# Patient Record
Sex: FEMALE | Race: WHITE | NOT HISPANIC OR LATINO
[De-identification: names, ages, dates, MRNs, and addresses within clinical notes are randomized per-mention and may not be internally consistent; named-entity substitution may affect disease eponyms.]

---

## 2024-02-08 ENCOUNTER — NON-APPOINTMENT (OUTPATIENT)
Age: 44
End: 2024-02-08

## 2024-03-12 ENCOUNTER — LABORATORY RESULT (OUTPATIENT)
Age: 44
End: 2024-03-12

## 2024-03-12 ENCOUNTER — TRANSCRIPTION ENCOUNTER (OUTPATIENT)
Age: 44
End: 2024-03-12

## 2024-03-12 ENCOUNTER — APPOINTMENT (OUTPATIENT)
Dept: NEUROLOGY | Facility: CLINIC | Age: 44
End: 2024-03-12
Payer: COMMERCIAL

## 2024-03-12 VITALS
SYSTOLIC BLOOD PRESSURE: 123 MMHG | BODY MASS INDEX: 23.7 KG/M2 | DIASTOLIC BLOOD PRESSURE: 77 MMHG | OXYGEN SATURATION: 98 % | WEIGHT: 160 LBS | HEART RATE: 68 BPM | HEIGHT: 69 IN

## 2024-03-12 DIAGNOSIS — Z82.0 FAMILY HISTORY OF EPILEPSY AND OTHER DISEASES OF THE NERVOUS SYSTEM: ICD-10-CM

## 2024-03-12 DIAGNOSIS — Z78.9 OTHER SPECIFIED HEALTH STATUS: ICD-10-CM

## 2024-03-12 PROCEDURE — 99215 OFFICE O/P EST HI 40 MIN: CPT

## 2024-03-12 NOTE — ASSESSMENT
[FreeTextEntry1] : Jessi Looney is a 43 year old female with no reported past medical history here for hospital follow-up.  Presenting to [facility] on 1/25/24 with intermittent paresthesia x 1 month to left arm and right leg and recent persistent head pressure/pain associated with neck discomfort/ tension. CT head with lucency in the left lenticulostriate nucleus, this could present a prominent perivascular space.  MRI brain confirmed incidental dilated perivascular space, no acute infarct.  MRI cervical spine with C5-6 disc herniation, no cord compression.  Started on prednisone pack for 1 week following viral infection post hospital which resolved her headaches.  Suspect possible occipital neuralgia.  With persistent paresthesia and family hx of autoimmune disorders including self, possible lupus diagnosis in the past (never followed up with rheumatology) will further investigate. On exam, + Phalens test on left arm otherwise no abnormal findings.  EMG/ NCV left arm and right leg will send labs to rule out contributing factors to paresthesia such as autoimmune panel, vitamin B12, B1, folate, ESR, CRP MRI Lumbar spine for intermittent right leg numbness to rule out structural abnormalities if head pain re-occurs can trial gabapentin or pregabalin in the future for management  Follow up in 2 months after testing complete

## 2024-03-12 NOTE — DATA REVIEWED
[de-identified] : 1/25/24- MRI Brain- FINDINGS: The ventricles, cisternal spaces, and cortical sulci are appropriate for the patient's stated age. There is no midline shift or extra-axial collection. No abnormal intracranial enhancement is present.   No abnormal signal is identified in the brain parenchyma. The small lucency within the inferior left putamen on earlier CT corresponds with a dilated perivascular space. The diffusion-weighted images demonstrate no evidence of recent ischemic injury. The susceptibility-weighted images demonstrate no parenchymal blood products. The large vascular flow-voids are preserved.   The paranasal sinuses and mastoid air cells are clear. The bone marrow signal is normal.  IMPRESSION:   Normal contrast-enhanced MRI of the brain. Incidental dilated perivascular space corresponds with the CT finding.  1/25/24- MRI Cervical Spine- FINDINGS:  Cervical vertebral alignment is preserved.   Facet joints appear aligned.   Cervical vertebral body heights are maintained. Cervical vertebral marrow signal intensity Is intact.  No pathologic vertebral enhancement is found.  No osseous expansion or epidural disease is found..  No destructive bone lesion is found.  Cervical intervertebral disc spaces demonstrate mild degenerative disc height loss at C5-C6. Cervical intervertebral disc signal intensity demonstrate mild variable signal intensity loss on the long TR images in the mid cervical spine.  No abnormal disc space enhancement is recognized. At C5-C6 shallow left central disc protrusion deforms the ventral thecal sac without direct compromise of the ventral cord surface. At the remaining cervical intervertebral disc levels no degenerative high-grade central canal compromise is recognized.  Neural foramina appear mildly narrowed by uncovertebral joint osteophytes in the lower cervical spine.   Cervical cord morphology Is preserved.  The cervical cord maintains intact signal intensity   No focal intrinsic cord lesion is identified.  No cord expansion or cord volume loss is recognized.  No abnormal enhancement occurs within the canal.  The visualized adjacent neck structures appear intact. The level 1 submental space structure 0.8 cm short axis is asymmetric to the contralateral left. It demonstrates no well-defined fatty hilum. Paraspinal soft tissues appear intact.  Visualized lymph nodes at the remaining lymph node stations appear to be within physiologic size limits.   IMPRESSION:     1. C5-C6 shallow left central disc protrusion  (disc herniation)  2. Right submandibular space 0.8 cm soft tissue nodule, likely borderline enlarged lymph node  [de-identified] : 1/25/24- CT Head- FINDINGS:   No acute intracranial hemorrhage. Lucency in the left lenticulostriate nucleus. No hydrocephalus. The extra-axial spaces and basal cisterns are within normal limits. No midline shift or mass effect present.  The cranial cervical junction is within normal limits. The sella is not expanded. No depressed calvarial fracture. The visualized paranasal sinuses are well aerated. The visualized mastoid air cells are well aerated. The visualized orbits are within normal limits.  IMPRESSION:   1.  No evidence of acute intracranial hemorrhage or midline shift. 2.  Lucency in the left lenticulostriate nucleus. This could represent a prominent perivascular space. Alternatively, a subacute to chronic infarct could present similarly. If there is continued concern for acute neurologic demise, recommend MRI of the brain for further evaluation.  Labs 1/25/24- TSH 1.91, Free T4 1.3

## 2024-03-12 NOTE — HISTORY OF PRESENT ILLNESS
[FreeTextEntry1] : Jessi Grey is a 42 yo female with no reported PMH here for hospital follow-up.  Presented to [facility] hospital on 1/25/24 with persistent head pressure associated with neck discomfort/ tension. Per patient, around 1 month prior started noticed back and neck pain making tasks such as getting out of bed more difficult.  Started taking OTC NSAIDS with moderate improvement.  Intermittent paresthesia with pins and needles to left arm from shoulder to fingertips.  She also experiences separately intermittent numbness to entire right leg triggered by driving.  Denies injury or lifting heavy objects besides her two children who weigh 30lbs.  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  CT head with lucency in the left lenticulostriate nucleus, this could present a prominent perivascular space.  MRI brain confirmed incidental dilated perivascular space, no acute infarct.  MRI cervical spine with C5-6 disc herniation, no cord compression.  Discharged home with persistent pain from neck/ back of head radiating to crown.  Developed viral infection following hospitalization in which she was put on prednisone which completed resolved her symptoms.  Since she has been feeling well.  Reports similar symptoms approximately 10 years ago in which she saw rheumatologist for possible diagnosis of lupus? never followed up after for symptoms resolved.   No hx of migraines.  She reports she has extensive family history of autoimmune disorders including an uncle that recently passed from Multiple Sclerosis. Denies vision changes, slurred speech, difficulty swallowing or chewing, muscle fatigue, gait instability, recent falls or injury.

## 2024-03-12 NOTE — PHYSICAL EXAM
[FreeTextEntry1] : General: No acute distress, Awake, Alert.    Mental status   Awake, alert, and oriented to person, time and place, Normal attention span and concentration, Recent and remote memory intact, Language intact, Fund of knowledge intact.     Cranial Nerves    II: VFF    III, IV, VI: PERRL, EOMI.   V: Facial sensation is normal B/L.    VII: Facial strength is normal B/L.    VIII: Gross hearing is intact.    IX, X: Palate is midline and elevates symmetrically.    XI: Trapezius normal strength.    XII: Tongue midline without atrophy or fasciculations.        Motor exam   Muscle tone - no evidence of rigidity or resistance in all 4 extremities.   No atrophy or fasciculations   Muscle Strength: arms and legs, proximal and distal flexors and extensors are normal  5/5 No UE drift.     + Phalens on left  Reflexes   All present, normal, and symmetrical 2+   Coordination   Finger to nose: Normal.   Heel to shin: Normal.       Sensory   Intact sensation to PP, vibration and cold.     Gait   Normal including heels, toes, and tandem gait.

## 2024-03-18 ENCOUNTER — NON-APPOINTMENT (OUTPATIENT)
Age: 44
End: 2024-03-18

## 2024-03-19 ENCOUNTER — NON-APPOINTMENT (OUTPATIENT)
Age: 44
End: 2024-03-19

## 2024-03-19 DIAGNOSIS — Z00.00 ENCOUNTER FOR GENERAL ADULT MEDICAL EXAMINATION W/OUT ABNORMAL FINDINGS: ICD-10-CM

## 2024-03-19 LAB
ACE BLD-CCNC: 48 U/L
ALBUPE: 17.6 %
ALPHA1UPE: 32 %
ALPHA2UPE: 21.2 %
ANA PAT FLD IF-IMP: ABNORMAL
ANACR T: ABNORMAL
BETAUPE: 16.6 %
CRP SERPL-MCNC: <3 MG/L
ENA SS-A AB SER IA-ACNC: <0.2 AL
ENA SS-B AB SER IA-ACNC: <0.2 AL
ERYTHROCYTE [SEDIMENTATION RATE] IN BLOOD BY WESTERGREN METHOD: 29 MM/HR
ESTIMATED AVERAGE GLUCOSE: 114 MG/DL
FOLATE SERPL-MCNC: >20 NG/ML
GAMMAUPE: 12.6 %
HBA1C MFR BLD HPLC: 5.6 %
IGA 24H UR QL IFE: NORMAL
KAPPA LC 24H UR QL: NORMAL
PROT PATTERN 24H UR ELPH-IMP: NORMAL
PROT UR-MCNC: 6 MG/DL
PROT UR-MCNC: 6 MG/DL
RHEUMATOID FACT SER QL: <10 IU/ML
TTG IGA SER IA-ACNC: 17.5 U/ML
TTG IGA SER-ACNC: POSITIVE
TTG IGG SER IA-ACNC: 25.4 U/ML
TTG IGG SER IA-ACNC: POSITIVE
VIT B1 SERPL-MCNC: 114.6 NMOL/L
VIT B12 SERPL-MCNC: 681 PG/ML
VIT B6 SERPL-MCNC: 32.8 UG/L

## 2024-03-20 LAB
ALBUMIN MFR SERPL ELPH: 60.6 %
ALBUMIN SERPL-MCNC: 4.7 G/DL
ALBUMIN/GLOB SERPL: 1.5 RATIO
ALPHA1 GLOB MFR SERPL ELPH: 3.6 %
ALPHA1 GLOB SERPL ELPH-MCNC: 0.3 G/DL
ALPHA2 GLOB MFR SERPL ELPH: 9.6 %
ALPHA2 GLOB SERPL ELPH-MCNC: 0.7 G/DL
B-GLOBULIN MFR SERPL ELPH: 10.8 %
B-GLOBULIN SERPL ELPH-MCNC: 0.8 G/DL
GAMMA GLOB FLD ELPH-MCNC: 1.2 G/DL
GAMMA GLOB MFR SERPL ELPH: 15.4 %
INTERPRETATION SERPL IEP-IMP: NORMAL
PROT SERPL-MCNC: 7.8 G/DL
PROT SERPL-MCNC: 7.8 G/DL

## 2024-04-01 ENCOUNTER — RESULT REVIEW (OUTPATIENT)
Age: 44
End: 2024-04-01

## 2024-04-16 ENCOUNTER — APPOINTMENT (OUTPATIENT)
Dept: NEUROLOGY | Facility: CLINIC | Age: 44
End: 2024-04-16
Payer: COMMERCIAL

## 2024-04-16 DIAGNOSIS — M54.12 RADICULOPATHY, CERVICAL REGION: ICD-10-CM

## 2024-04-16 DIAGNOSIS — R20.2 PARESTHESIA OF SKIN: ICD-10-CM

## 2024-04-16 PROCEDURE — 99204 OFFICE O/P NEW MOD 45 MIN: CPT

## 2024-04-16 PROCEDURE — 95912 NRV CNDJ TEST 11-12 STUDIES: CPT

## 2024-04-16 PROCEDURE — 95886 MUSC TEST DONE W/N TEST COMP: CPT

## 2024-04-16 NOTE — ASSESSMENT
[FreeTextEntry1] : EMG/NCS of the right leg and left arm showed mild right L3 or L4 radiculopathy and no electrical correlate for left arm symptoms.  EMG REPORT WILL BE UPLOADED SEPARATELY AS A PDF

## 2024-04-16 NOTE — DATA REVIEWED
[de-identified] : ESR 29 H, + transglutaminase IgA and IgG.  ELIZABETH 1:160 reflex neg SPEP, RF, CRP, B12, folate, lyme, ACE, b1, b6, ANCA, immunofixation MRI L spine with L3-L4 rightward disc encroaching neural foramina MRI C spine with C5-C6 shallow left central disc protrusion MRI brain prominent perivascular space

## 2024-04-16 NOTE — HISTORY OF PRESENT ILLNESS
[FreeTextEntry1] : LILIA LUCIO is a 43 year who is referred for clinical neurophysiological consultation and EMG/NCS for paresthesias  Symptoms most in right leg and left arm.  Left arm was months ago and right leg was 2 months ago. Worse when sitting and diving in car. Sometimes symptoms when in bed a night.  Not painful but there is numbness down the whole leg. Feels a need to shake the leg or the arm to relieve symptoms.  She had pain in the neck and back last about 3 months ago but it is not currently present.  Symptoms don't wake her from sleep but the left arm is often present in the morning.  Denies stomach issues.   Denies diplopia, blurred vision, dysphagia, dysarthria, aphasia, focal weakness or numbness, bowel or bladder dysfunction, imbalance, falls, headaches.

## 2024-04-16 NOTE — PHYSICAL EXAM
[FreeTextEntry1] : General: this is a pleasant patient in no acute distress  HEENT conjunctiva are normal, no tenderness in head  CV: normal pulses, regular rate and rhythm, no peripheral edema noted  Lungs: breathing is non-labored  abd: soft and non-distended  MSK: SLR: neg CURLY: neg range of motion: tinnels: neg spurling: Occipital nerve tenderness:  Mental status: Alert and oriented to person, place and time, normal speech and comprehension  Cranial Nerves: extra-occular movements in tact without nystagmus, normal saccades and smooth pursuit, Face symmetric and facial strength symmetric, facial sensation symmetric,   Motor: normal bulk and tone throughout. no abnormal movements.  Full 5/5 strength uppers and lower extremities proximally and distally  Sensory: in tact and symmetric to vibration, light tough, temperature  Cerebellar: normal finger-nose-finger bilaterally  Reflexes: 2+ in the upper and lower extremities and symmetric.  toes are bilaterally downgoing.  Gait: stable, able to tip toe heel and tandem  Stances: Romberg: normal Shapened romberg: normal

## 2024-04-22 ENCOUNTER — APPOINTMENT (OUTPATIENT)
Dept: NEUROLOGY | Facility: CLINIC | Age: 44
End: 2024-04-22
Payer: COMMERCIAL

## 2024-04-22 VITALS
SYSTOLIC BLOOD PRESSURE: 130 MMHG | OXYGEN SATURATION: 98 % | RESPIRATION RATE: 18 BRPM | HEART RATE: 72 BPM | DIASTOLIC BLOOD PRESSURE: 80 MMHG

## 2024-04-22 DIAGNOSIS — M54.16 RADICULOPATHY, LUMBAR REGION: ICD-10-CM

## 2024-04-22 PROCEDURE — 99214 OFFICE O/P EST MOD 30 MIN: CPT

## 2024-04-22 NOTE — HISTORY OF PRESENT ILLNESS
[FreeTextEntry1] : Jessi is here in follow-up. EMG/ NCV left arm and right leg slight abnormal, evidence of mild right L3 or L4 radiculopathy.  No electrical correlate for left arm symptoms.  No evidence of myopathy, plexopathy, entrapment neuropathy or polyneuropathy. MRI Lumbar spine- small disc protrusion at L3-L4 and L5-S1, essentially unremarkable.   Referral given for PT provided - did not start as of yet. Head pressure, neck discomfort/ tension have resolved since last visit. Anacr abnormal, ESR elevated - referred to rheumatology, working on getting appointment in the upcoming weeks. Overall symptoms in right leg continue, triggered by driving and sitting for extended period of time.  She does not feel it is progressively getting worse but has not improved since last visit.  Alleviating with movement and standing.  Most pronounced in anterior thigh and radiates down leg.  No pain or weakness.  3/12/24 Jessi Grey is a 42 yo female with no reported PMH here for hospital follow-up.  Presented to [facility] hospital on 1/25/24 with persistent head pressure associated with neck discomfort/ tension. Per patient, around 1 month prior started noticed back and neck pain making tasks such as getting out of bed more difficult.  Started taking OTC NSAIDS with moderate improvement.  Intermittent paresthesia with pins and needles to left arm from shoulder to fingertips.  She also experiences separately intermittent numbness to entire right leg triggered by driving.  Denies injury or lifting heavy objects besides her two children who weigh 30lbs.  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  CT head with lucency in the left lenticulostriate nucleus, this could present a prominent perivascular space.  MRI brain confirmed incidental dilated perivascular space, no acute infarct.  MRI cervical spine with C5-6 disc herniation, no cord compression.  Discharged home with persistent pain from neck/ back of head radiating to crown.  Developed viral infection following hospitalization in which she was put on prednisone which completed resolved her symptoms.  Since she has been feeling well.  Reports similar symptoms approximately 10 years ago in which she saw rheumatologist for possible diagnosis of lupus? never followed up after for symptoms resolved.   No hx of migraines.  She reports she has extensive family history of autoimmune disorders including an uncle that recently passed from Multiple Sclerosis. Denies vision changes, slurred speech, difficulty swallowing or chewing, muscle fatigue, gait instability, recent falls or injury.

## 2024-04-22 NOTE — ASSESSMENT
[FreeTextEntry1] : Jessi Looney is a 43 year old female with no reported past medical history here for hospital follow-up.  Presenting to [facility] on 1/25/24 with intermittent paresthesia x 1 month to left arm and right leg and recent persistent head pressure/pain associated with neck discomfort/ tension. CT head with lucency in the left lenticulostriate nucleus, this could present a prominent perivascular space.  MRI brain confirmed incidental dilated perivascular space, no acute infarct.  MRI cervical spine with C5-6 disc herniation, no cord compression.  Started on prednisone pack for 1 week following viral infection post hospital which resolved her headaches.  Suspect possible occipital neuralgia.  With persistent paresthesia and family hx of autoimmune disorders including self, possible lupus diagnosis in the past (never followed up with rheumatology) will further investigate. On exam, + Phalens test on left arm otherwise no abnormal findings. EMG/ NCV with evidence of lumbar radiculopathy.  Neck pain and head pressure have resolved at this time.    Physical therapy for lumbar radiculopathy. NSAIDs or acetaminophen as needed. Encouraged activity modifications, frequent stretching and ambulation.  Avoid prolong sitting if possible. In future if symptoms persist can consider muscle relaxants or possible NEVAEH.  Follow up in 3 months

## 2024-04-22 NOTE — DATA REVIEWED
[de-identified] : 1/25/24- MRI Brain- FINDINGS: The ventricles, cisternal spaces, and cortical sulci are appropriate for the patient's stated age. There is no midline shift or extra-axial collection. No abnormal intracranial enhancement is present.   No abnormal signal is identified in the brain parenchyma. The small lucency within the inferior left putamen on earlier CT corresponds with a dilated perivascular space. The diffusion-weighted images demonstrate no evidence of recent ischemic injury. The susceptibility-weighted images demonstrate no parenchymal blood products. The large vascular flow-voids are preserved.   The paranasal sinuses and mastoid air cells are clear. The bone marrow signal is normal.  IMPRESSION:   Normal contrast-enhanced MRI of the brain. Incidental dilated perivascular space corresponds with the CT finding.  1/25/24- MRI Cervical Spine- FINDINGS:  Cervical vertebral alignment is preserved.   Facet joints appear aligned.   Cervical vertebral body heights are maintained. Cervical vertebral marrow signal intensity Is intact.  No pathologic vertebral enhancement is found.  No osseous expansion or epidural disease is found..  No destructive bone lesion is found.  Cervical intervertebral disc spaces demonstrate mild degenerative disc height loss at C5-C6. Cervical intervertebral disc signal intensity demonstrate mild variable signal intensity loss on the long TR images in the mid cervical spine.  No abnormal disc space enhancement is recognized. At C5-C6 shallow left central disc protrusion deforms the ventral thecal sac without direct compromise of the ventral cord surface. At the remaining cervical intervertebral disc levels no degenerative high-grade central canal compromise is recognized.  Neural foramina appear mildly narrowed by uncovertebral joint osteophytes in the lower cervical spine.   Cervical cord morphology Is preserved.  The cervical cord maintains intact signal intensity   No focal intrinsic cord lesion is identified.  No cord expansion or cord volume loss is recognized.  No abnormal enhancement occurs within the canal.  The visualized adjacent neck structures appear intact. The level 1 submental space structure 0.8 cm short axis is asymmetric to the contralateral left. It demonstrates no well-defined fatty hilum. Paraspinal soft tissues appear intact.  Visualized lymph nodes at the remaining lymph node stations appear to be within physiologic size limits.   IMPRESSION:     1. C5-C6 shallow left central disc protrusion  (disc herniation)  2. Right submandibular space 0.8 cm soft tissue nodule, likely borderline enlarged lymph node   4/1/24-MRI Lumbar Spine: FINDINGS:   LOCALIZER: No additional findings.  BONES: There is no fracture or bone marrow edema.  ALIGNMENT: The alignment is normal.  SACROILIAC JOINTS/SACRUM: There is no sacral fracture. The SI joints are partially visualized but are intact.  CONUS AND CAUDA EQUINA: The distal cord and conus are normal in signal. Conus terminates at L1.  VISUALIZED INTRAPELVIC/INTRA-ABDOMINAL SOFT TISSUES: Normal.  PARASPINAL SOFT TISSUES: Normal.    INDIVIDUAL LEVELS:   LOWER THORACIC SPINE: No spinal canal or neuroforaminal stenosis.   L1-L2: No spinal canal or neuroforaminal stenosis.  L2-L3: No spinal canal or neuroforaminal stenosis.  L3-L4: No spinal canal stenosis. There is a small right foraminal disc protrusion resulting in mild neuroforaminal stenosis.  L4-L5: No spinal canal or neuroforaminal stenosis.  L5-S1: Small central disc protrusion without significant spinal canal or neuroforaminal stenosis.   IMPRESSION:  Essentially unremarkable exam. [de-identified] : EMG/ NCV- slightly abnormal EMG/ NCS.  There is electrophysiologic evidence of mild right L3 or L4 radiculopathy.   No electrical correlate for left arm symptoms.  A normal EMG does not rule out mild cervical radiculopathy.  There is no evidence of myopathy, plexopathy, entrapment neuropathy or polyneuropathy.   1/25/24- CT Head- FINDINGS:   No acute intracranial hemorrhage. Lucency in the left lenticulostriate nucleus. No hydrocephalus. The extra-axial spaces and basal cisterns are within normal limits. No midline shift or mass effect present.  The cranial cervical junction is within normal limits. The sella is not expanded. No depressed calvarial fracture. The visualized paranasal sinuses are well aerated. The visualized mastoid air cells are well aerated. The visualized orbits are within normal limits.  IMPRESSION:   1.  No evidence of acute intracranial hemorrhage or midline shift. 2.  Lucency in the left lenticulostriate nucleus. This could represent a prominent perivascular space. Alternatively, a subacute to chronic infarct could present similarly. If there is continued concern for acute neurologic demise, recommend MRI of the brain for further evaluation.  Labs 1/25/24- TSH 1.91, Free T4 1.3

## 2024-05-29 DIAGNOSIS — R73.09 OTHER ABNORMAL GLUCOSE: ICD-10-CM

## 2024-06-10 ENCOUNTER — NON-APPOINTMENT (OUTPATIENT)
Age: 44
End: 2024-06-10

## 2024-07-22 ENCOUNTER — APPOINTMENT (OUTPATIENT)
Dept: NEUROLOGY | Facility: CLINIC | Age: 44
End: 2024-07-22

## 2024-10-30 ENCOUNTER — NON-APPOINTMENT (OUTPATIENT)
Age: 44
End: 2024-10-30

## 2024-11-04 ENCOUNTER — NON-APPOINTMENT (OUTPATIENT)
Age: 44
End: 2024-11-04

## 2024-11-06 ENCOUNTER — APPOINTMENT (OUTPATIENT)
Dept: BREAST CENTER | Facility: CLINIC | Age: 44
End: 2024-11-06
Payer: COMMERCIAL

## 2024-11-06 VITALS
HEART RATE: 79 BPM | WEIGHT: 160 LBS | DIASTOLIC BLOOD PRESSURE: 67 MMHG | BODY MASS INDEX: 23.7 KG/M2 | SYSTOLIC BLOOD PRESSURE: 112 MMHG | HEIGHT: 69 IN

## 2024-11-06 DIAGNOSIS — Z82.69 FAMILY HISTORY OF OTHER DISEASES OF THE MUSCULOSKELETAL SYSTEM AND CONNECTIVE TISSUE: ICD-10-CM

## 2024-11-06 DIAGNOSIS — K90.0 CELIAC DISEASE: ICD-10-CM

## 2024-11-06 DIAGNOSIS — Z91.89 OTHER SPECIFIED PERSONAL RISK FACTORS, NOT ELSEWHERE CLASSIFIED: ICD-10-CM

## 2024-11-06 DIAGNOSIS — R92.343 MAMMOGRAPHIC EXTREME DENSITY, BILATERAL BREASTS: ICD-10-CM

## 2024-11-06 DIAGNOSIS — N63.24 UNSPECIFIED LUMP IN THE LEFT BREAST, LOWER INNER QUADRANT: ICD-10-CM

## 2024-11-06 DIAGNOSIS — Z80.0 FAMILY HISTORY OF MALIGNANT NEOPLASM OF DIGESTIVE ORGANS: ICD-10-CM

## 2024-11-06 DIAGNOSIS — Z12.31 ENCOUNTER FOR SCREENING MAMMOGRAM FOR MALIGNANT NEOPLASM OF BREAST: ICD-10-CM

## 2024-11-06 DIAGNOSIS — Z80.8 FAMILY HISTORY OF MALIGNANT NEOPLASM OF OTHER ORGANS OR SYSTEMS: ICD-10-CM

## 2024-11-06 DIAGNOSIS — R92.8 OTHER ABNORMAL AND INCONCLUSIVE FINDINGS ON DIAGNOSTIC IMAGING OF BREAST: ICD-10-CM

## 2024-11-06 DIAGNOSIS — Z80.7 FAMILY HISTORY OF OTHER MALIGNANT NEOPLASMS OF LYMPHOID, HEMATOPOIETIC AND RELATED TISSUES: ICD-10-CM

## 2024-11-06 DIAGNOSIS — Z82.49 FAMILY HISTORY OF ISCHEMIC HEART DISEASE AND OTHER DISEASES OF THE CIRCULATORY SYSTEM: ICD-10-CM

## 2024-11-06 PROCEDURE — 99204 OFFICE O/P NEW MOD 45 MIN: CPT

## 2024-11-06 RX ORDER — MULTIVITAMIN
TABLET ORAL
Refills: 0 | Status: ACTIVE | COMMUNITY

## 2024-11-20 ENCOUNTER — NON-APPOINTMENT (OUTPATIENT)
Age: 44
End: 2024-11-20

## 2024-12-03 ENCOUNTER — RESULT REVIEW (OUTPATIENT)
Age: 44
End: 2024-12-03

## 2024-12-19 ENCOUNTER — RESULT REVIEW (OUTPATIENT)
Age: 44
End: 2024-12-19

## 2024-12-23 ENCOUNTER — NON-APPOINTMENT (OUTPATIENT)
Age: 44
End: 2024-12-23

## 2025-01-07 DIAGNOSIS — R35.0 FREQUENCY OF MICTURITION: ICD-10-CM

## 2025-01-07 DIAGNOSIS — N87.9 DYSPLASIA OF CERVIX UTERI, UNSPECIFIED: ICD-10-CM

## 2025-01-07 DIAGNOSIS — R39.89 OTHER SYMPTOMS AND SIGNS INVOLVING THE GENITOURINARY SYSTEM: ICD-10-CM

## 2025-01-07 DIAGNOSIS — Z80.42 FAMILY HISTORY OF MALIGNANT NEOPLASM OF PROSTATE: ICD-10-CM

## 2025-01-07 DIAGNOSIS — Z86.018 PERSONAL HISTORY OF OTHER BENIGN NEOPLASM: ICD-10-CM

## 2025-01-07 DIAGNOSIS — Z80.6 FAMILY HISTORY OF LEUKEMIA: ICD-10-CM

## 2025-01-07 DIAGNOSIS — Z86.69 PERSONAL HISTORY OF OTHER DISEASES OF THE NERVOUS SYSTEM AND SENSE ORGANS: ICD-10-CM

## 2025-01-08 ENCOUNTER — APPOINTMENT (OUTPATIENT)
Dept: GYNECOLOGIC ONCOLOGY | Facility: CLINIC | Age: 45
End: 2025-01-08
Payer: COMMERCIAL

## 2025-01-08 ENCOUNTER — RESULT REVIEW (OUTPATIENT)
Age: 45
End: 2025-01-08

## 2025-01-08 VITALS
OXYGEN SATURATION: 100 % | BODY MASS INDEX: 23.7 KG/M2 | WEIGHT: 160 LBS | DIASTOLIC BLOOD PRESSURE: 81 MMHG | SYSTOLIC BLOOD PRESSURE: 127 MMHG | HEIGHT: 69 IN | HEART RATE: 79 BPM

## 2025-01-08 DIAGNOSIS — D25.2 SUBSEROSAL LEIOMYOMA OF UTERUS: ICD-10-CM

## 2025-01-08 DIAGNOSIS — Z82.49 FAMILY HISTORY OF ISCHEMIC HEART DISEASE AND OTHER DISEASES OF THE CIRCULATORY SYSTEM: ICD-10-CM

## 2025-01-08 DIAGNOSIS — N92.0 EXCESSIVE AND FREQUENT MENSTRUATION WITH REGULAR CYCLE: ICD-10-CM

## 2025-01-08 DIAGNOSIS — N83.299 OTHER OVARIAN CYST, UNSPECIFIED SIDE: ICD-10-CM

## 2025-01-08 DIAGNOSIS — N85.8 OTHER SPECIFIED NONINFLAMMATORY DISORDERS OF UTERUS: ICD-10-CM

## 2025-01-08 DIAGNOSIS — N93.9 ABNORMAL UTERINE AND VAGINAL BLEEDING, UNSPECIFIED: ICD-10-CM

## 2025-01-08 DIAGNOSIS — Z87.42 PERSONAL HISTORY OF OTHER DISEASES OF THE FEMALE GENITAL TRACT: ICD-10-CM

## 2025-01-08 PROCEDURE — 99205 OFFICE O/P NEW HI 60 MIN: CPT | Mod: 25

## 2025-01-08 PROCEDURE — 58100 BIOPSY OF UTERUS LINING: CPT

## 2025-01-08 PROCEDURE — 99459 PELVIC EXAMINATION: CPT

## 2025-02-04 ENCOUNTER — APPOINTMENT (OUTPATIENT)
Dept: GYNECOLOGIC ONCOLOGY | Facility: HOSPITAL | Age: 45
End: 2025-02-04

## 2025-02-04 ENCOUNTER — RESULT REVIEW (OUTPATIENT)
Age: 45
End: 2025-02-04

## 2025-02-04 ENCOUNTER — TRANSCRIPTION ENCOUNTER (OUTPATIENT)
Age: 45
End: 2025-02-04

## 2025-02-04 DIAGNOSIS — R11.2 NAUSEA WITH VOMITING, UNSPECIFIED: ICD-10-CM

## 2025-02-06 PROBLEM — R11.2 NAUSEA WITH VOMITING: Status: ACTIVE | Noted: 2025-02-06

## 2025-02-06 RX ORDER — PROCHLORPERAZINE MALEATE 5 MG/1
5 TABLET ORAL
Qty: 16 | Refills: 0 | Status: ACTIVE | COMMUNITY
Start: 2025-02-06 | End: 1900-01-01

## 2025-02-19 ENCOUNTER — LABORATORY RESULT (OUTPATIENT)
Age: 45
End: 2025-02-19

## 2025-02-19 ENCOUNTER — APPOINTMENT (OUTPATIENT)
Dept: GYNECOLOGIC ONCOLOGY | Facility: CLINIC | Age: 45
End: 2025-02-19
Payer: COMMERCIAL

## 2025-02-19 VITALS — DIASTOLIC BLOOD PRESSURE: 82 MMHG | HEART RATE: 78 BPM | SYSTOLIC BLOOD PRESSURE: 121 MMHG | OXYGEN SATURATION: 100 %

## 2025-02-19 DIAGNOSIS — N93.9 ABNORMAL UTERINE AND VAGINAL BLEEDING, UNSPECIFIED: ICD-10-CM

## 2025-02-19 DIAGNOSIS — R10.2 PELVIC AND PERINEAL PAIN: ICD-10-CM

## 2025-02-19 DIAGNOSIS — D25.9 LEIOMYOMA OF UTERUS, UNSPECIFIED: ICD-10-CM

## 2025-02-19 PROCEDURE — 99024 POSTOP FOLLOW-UP VISIT: CPT

## 2025-02-20 LAB
APPEARANCE: CLEAR
BILIRUBIN URINE: NEGATIVE
BLOOD URINE: NEGATIVE
COLOR: YELLOW
GLUCOSE QUALITATIVE U: NEGATIVE MG/DL
KETONES URINE: NEGATIVE MG/DL
LEUKOCYTE ESTERASE URINE: ABNORMAL
NITRITE URINE: NEGATIVE
PH URINE: 6
PROTEIN URINE: NEGATIVE MG/DL
SPECIFIC GRAVITY URINE: 1.01
UROBILINOGEN URINE: 0.2 MG/DL

## 2025-02-21 ENCOUNTER — NON-APPOINTMENT (OUTPATIENT)
Age: 45
End: 2025-02-21

## 2025-02-24 ENCOUNTER — RESULT REVIEW (OUTPATIENT)
Age: 45
End: 2025-02-24

## 2025-03-10 ENCOUNTER — APPOINTMENT (OUTPATIENT)
Dept: GYNECOLOGIC ONCOLOGY | Facility: CLINIC | Age: 45
End: 2025-03-10
Payer: COMMERCIAL

## 2025-03-10 DIAGNOSIS — Z90.710 ACQUIRED ABSENCE OF BOTH CERVIX AND UTERUS: ICD-10-CM

## 2025-03-10 DIAGNOSIS — N85.8 OTHER SPECIFIED NONINFLAMMATORY DISORDERS OF UTERUS: ICD-10-CM

## 2025-03-10 PROCEDURE — 99024 POSTOP FOLLOW-UP VISIT: CPT

## 2025-04-07 ENCOUNTER — APPOINTMENT (OUTPATIENT)
Dept: GYNECOLOGIC ONCOLOGY | Facility: CLINIC | Age: 45
End: 2025-04-07
Payer: COMMERCIAL

## 2025-04-07 VITALS
OXYGEN SATURATION: 100 % | BODY MASS INDEX: 23.99 KG/M2 | SYSTOLIC BLOOD PRESSURE: 133 MMHG | HEIGHT: 69 IN | DIASTOLIC BLOOD PRESSURE: 81 MMHG | HEART RATE: 79 BPM | WEIGHT: 162 LBS

## 2025-04-07 DIAGNOSIS — N93.9 ABNORMAL UTERINE AND VAGINAL BLEEDING, UNSPECIFIED: ICD-10-CM

## 2025-04-07 DIAGNOSIS — N85.8 OTHER SPECIFIED NONINFLAMMATORY DISORDERS OF UTERUS: ICD-10-CM

## 2025-04-07 PROCEDURE — 99213 OFFICE O/P EST LOW 20 MIN: CPT | Mod: 24

## 2025-04-07 PROCEDURE — 99459 PELVIC EXAMINATION: CPT

## 2025-04-16 ENCOUNTER — NON-APPOINTMENT (OUTPATIENT)
Age: 45
End: 2025-04-16

## 2025-07-31 ENCOUNTER — TRANSCRIPTION ENCOUNTER (OUTPATIENT)
Age: 45
End: 2025-07-31

## 2025-08-11 ENCOUNTER — RESULT REVIEW (OUTPATIENT)
Age: 45
End: 2025-08-11

## 2025-08-13 ENCOUNTER — NON-APPOINTMENT (OUTPATIENT)
Age: 45
End: 2025-08-13

## 2025-09-25 PROBLEM — Z86.69 HISTORY OF RETINAL DETACHMENT: Status: RESOLVED | Noted: 2025-09-23 | Resolved: 2025-09-25
